# Patient Record
Sex: FEMALE | Race: WHITE | NOT HISPANIC OR LATINO | Employment: FULL TIME | URBAN - METROPOLITAN AREA
[De-identification: names, ages, dates, MRNs, and addresses within clinical notes are randomized per-mention and may not be internally consistent; named-entity substitution may affect disease eponyms.]

---

## 2023-03-05 PROBLEM — J06.9 URI (UPPER RESPIRATORY INFECTION): Status: ACTIVE | Noted: 2023-03-05

## 2023-03-05 PROBLEM — L98.499 SKIN ULCERATION (MULTI): Status: ACTIVE | Noted: 2023-03-05

## 2023-03-05 PROBLEM — L08.9 SKIN PUSTULE: Status: ACTIVE | Noted: 2023-03-05

## 2023-03-05 RX ORDER — NORGESTIMATE AND ETHINYL ESTRADIOL 0.25-0.035
1 KIT ORAL DAILY
COMMUNITY
Start: 2021-07-30 | End: 2023-05-11 | Stop reason: SDUPTHER

## 2023-04-28 ENCOUNTER — OFFICE VISIT (OUTPATIENT)
Dept: PRIMARY CARE | Facility: CLINIC | Age: 28
End: 2023-04-28
Payer: COMMERCIAL

## 2023-04-28 VITALS
TEMPERATURE: 98.7 F | RESPIRATION RATE: 18 BRPM | OXYGEN SATURATION: 98 % | BODY MASS INDEX: 37.94 KG/M2 | HEART RATE: 97 BPM | SYSTOLIC BLOOD PRESSURE: 126 MMHG | DIASTOLIC BLOOD PRESSURE: 82 MMHG | WEIGHT: 228 LBS

## 2023-04-28 DIAGNOSIS — J01.00 ACUTE NON-RECURRENT MAXILLARY SINUSITIS: Primary | ICD-10-CM

## 2023-04-28 DIAGNOSIS — R09.1 PLEURISY: ICD-10-CM

## 2023-04-28 PROCEDURE — 1036F TOBACCO NON-USER: CPT | Performed by: NURSE PRACTITIONER

## 2023-04-28 PROCEDURE — 99213 OFFICE O/P EST LOW 20 MIN: CPT | Performed by: NURSE PRACTITIONER

## 2023-04-28 RX ORDER — PREDNISONE 50 MG/1
50 TABLET ORAL DAILY
Qty: 5 TABLET | Refills: 0 | Status: SHIPPED | OUTPATIENT
Start: 2023-04-28 | End: 2023-05-03

## 2023-04-28 ASSESSMENT — ENCOUNTER SYMPTOMS
PALPITATIONS: 0
SINUS PRESSURE: 1
COUGH: 1
FEVER: 0
SHORTNESS OF BREATH: 0
WHEEZING: 0

## 2023-04-28 NOTE — PROGRESS NOTES
Subjective   Patient ID: Cheryl Bolivar is a 28 y.o. female who presents for Sinusitis. Symptoms initially started 4-5 days ago and had been improving, but last night felt like it had moved into her lungs and was more difficult to take a deep breath.     Sinusitis  Associated symptoms include coughing and sinus pressure. Pertinent negatives include no shortness of breath.       Review of Systems   Constitutional:  Negative for fever.   HENT:  Positive for sinus pressure.    Respiratory:  Positive for cough. Negative for shortness of breath and wheezing.    Cardiovascular:  Negative for chest pain and palpitations.       Objective   /82   Pulse 97   Temp 37.1 °C (98.7 °F)   Resp 18   Wt 103 kg (228 lb)   SpO2 98%   BMI 37.94 kg/m²     Physical Exam  Constitutional:       Appearance: Normal appearance.   HENT:      Right Ear: Tympanic membrane normal.      Left Ear: Tympanic membrane normal.      Nose: Rhinorrhea present.      Mouth/Throat:      Mouth: Mucous membranes are moist.   Cardiovascular:      Rate and Rhythm: Normal rate and regular rhythm.   Pulmonary:      Effort: Pulmonary effort is normal.      Comments: Left upper lobe pleural friction rub  Otherwise clear.  Moist, non-productive cough    Musculoskeletal:      Cervical back: Normal range of motion.   Neurological:      Mental Status: She is alert.     Assessment/Plan   Problem List Items Addressed This Visit    None  Visit Diagnoses       Acute non-recurrent maxillary sinusitis    -  Primary    Continue supportive care w/ OTC meds, Robitussin DM, nasocort. Encourage increased oral fluids.    Pleurisy        Relevant Medications    predniSONE (Deltasone) 50 mg tablet          Follow-up as needed if symptoms do not resolve w/ treatment or if you develop dyspnea.

## 2023-05-11 ENCOUNTER — OFFICE VISIT (OUTPATIENT)
Dept: PRIMARY CARE | Facility: CLINIC | Age: 28
End: 2023-05-11
Payer: COMMERCIAL

## 2023-05-11 VITALS
OXYGEN SATURATION: 100 % | HEART RATE: 60 BPM | DIASTOLIC BLOOD PRESSURE: 72 MMHG | TEMPERATURE: 98.5 F | HEIGHT: 65 IN | BODY MASS INDEX: 37.15 KG/M2 | RESPIRATION RATE: 16 BRPM | WEIGHT: 223 LBS | SYSTOLIC BLOOD PRESSURE: 118 MMHG

## 2023-05-11 DIAGNOSIS — R05.2 SUBACUTE COUGH: Primary | ICD-10-CM

## 2023-05-11 DIAGNOSIS — Z30.41 ENCOUNTER FOR SURVEILLANCE OF CONTRACEPTIVE PILLS: ICD-10-CM

## 2023-05-11 DIAGNOSIS — Z00.00 HEALTH CARE MAINTENANCE: ICD-10-CM

## 2023-05-11 PROBLEM — L08.9 SKIN PUSTULE: Status: RESOLVED | Noted: 2023-03-05 | Resolved: 2023-05-11

## 2023-05-11 PROBLEM — L98.499 SKIN ULCERATION (MULTI): Status: RESOLVED | Noted: 2023-03-05 | Resolved: 2023-05-11

## 2023-05-11 PROBLEM — J06.9 URI (UPPER RESPIRATORY INFECTION): Status: RESOLVED | Noted: 2023-03-05 | Resolved: 2023-05-11

## 2023-05-11 PROCEDURE — 1036F TOBACCO NON-USER: CPT | Performed by: INTERNAL MEDICINE

## 2023-05-11 PROCEDURE — 99395 PREV VISIT EST AGE 18-39: CPT | Performed by: INTERNAL MEDICINE

## 2023-05-11 RX ORDER — ALBUTEROL SULFATE 90 UG/1
2 AEROSOL, METERED RESPIRATORY (INHALATION) EVERY 4 HOURS PRN
Qty: 8.5 G | Refills: 0 | Status: SHIPPED | OUTPATIENT
Start: 2023-05-11 | End: 2024-05-10

## 2023-05-11 RX ORDER — NORGESTIMATE AND ETHINYL ESTRADIOL 0.25-0.035
1 KIT ORAL DAILY
Qty: 84 TABLET | Refills: 3 | Status: SHIPPED | OUTPATIENT
Start: 2023-05-11

## 2023-05-11 RX ORDER — ASCORBIC ACID/MULTIVIT-MIN 1000 MG
EFFERVESCENT POWDER IN PACKET ORAL DAILY
COMMUNITY

## 2023-05-11 ASSESSMENT — ENCOUNTER SYMPTOMS
ARTHRALGIAS: 0
EYE REDNESS: 0
HEADACHES: 0
PALPITATIONS: 0
SHORTNESS OF BREATH: 0
NAUSEA: 0
WHEEZING: 0
EYE PAIN: 0
SORE THROAT: 0
EYE ITCHING: 0
WEAKNESS: 0
DIFFICULTY URINATING: 0
UNEXPECTED WEIGHT CHANGE: 0
FATIGUE: 0
DIARRHEA: 0
RHINORRHEA: 0
FREQUENCY: 0
FEVER: 0
DYSURIA: 0
COUGH: 0
PSYCHIATRIC NEGATIVE: 1
CONSTIPATION: 0
ABDOMINAL PAIN: 0
BACK PAIN: 0

## 2023-05-11 ASSESSMENT — PATIENT HEALTH QUESTIONNAIRE - PHQ9
2. FEELING DOWN, DEPRESSED OR HOPELESS: NOT AT ALL
1. LITTLE INTEREST OR PLEASURE IN DOING THINGS: NOT AT ALL
SUM OF ALL RESPONSES TO PHQ9 QUESTIONS 1 AND 2: 0

## 2023-05-11 ASSESSMENT — PAIN SCALES - GENERAL: PAINLEVEL: 0-NO PAIN

## 2023-05-11 NOTE — PROGRESS NOTES
"Subjective   Cheryl Bolivar is a 28 y.o. female who presents for [facility].    HPI   New pt to establish    Reports recent h/o Pleurisy.  Seen at [facility] x2 weeks ago.  Rx: Prednisone  Steroid improved SOB.  Reports continued non-productive cough.  Not worse.   No current OTC tx.  Denies chest pain/tightness.     Review of Systems   Constitutional:  Negative for fatigue, fever and unexpected weight change.   HENT:  Negative for congestion, ear pain, rhinorrhea and sore throat.    Eyes:  Negative for pain, redness and itching.   Respiratory:  Negative for cough, shortness of breath and wheezing.    Cardiovascular:  Negative for chest pain, palpitations and leg swelling.   Gastrointestinal:  Negative for abdominal pain, constipation, diarrhea and nausea.   Genitourinary:  Negative for difficulty urinating, dysuria and frequency.   Musculoskeletal:  Negative for arthralgias and back pain.   Allergic/Immunologic: Negative for environmental allergies, food allergies and immunocompromised state.   Neurological:  Negative for weakness and headaches.   Psychiatric/Behavioral: Negative.     All other systems reviewed and are negative.      Health Maintenance Due   Topic Date Due    Yearly Adult Physical  Never done    Lipid Panel  Never done    COVID-19 Vaccine (1) Never done    Cervical Cancer Screening  Never done       Objective   /72   Pulse 60   Temp 36.9 °C (98.5 °F)   Resp 16   Ht 1.651 m (5' 5\")   Wt 101 kg (223 lb)   LMP 04/27/2023   SpO2 100%   BMI 37.11 kg/m²     Physical Exam  Vitals and nursing note reviewed.   Constitutional:       Appearance: Normal appearance.   HENT:      Head: Normocephalic.   Eyes:      Conjunctiva/sclera: Conjunctivae normal.      Pupils: Pupils are equal, round, and reactive to light.   Cardiovascular:      Rate and Rhythm: Normal rate and regular rhythm.      Pulses: Normal pulses.      Heart sounds: Normal heart sounds.   Pulmonary:      Effort: Pulmonary " effort is normal.      Breath sounds: Normal breath sounds.   Musculoskeletal:         General: No swelling.      Cervical back: Neck supple.   Skin:     General: Skin is warm and dry.   Neurological:      General: No focal deficit present.      Mental Status: She is oriented to person, place, and time.         Assessment/Plan   Problem List Items Addressed This Visit    None  Visit Diagnoses       Subacute cough    -  Primary    Relevant Medications    albuterol (ProAir HFA) 90 mcg/actuation inhaler    Encounter for surveillance of contraceptive pills        Relevant Medications    norgestimate-ethinyl estradioL (Ortho-Cyclen) 0.25-35 mg-mcg tablet    Health care maintenance        Relevant Orders    CBC    Comprehensive Metabolic Panel    Lipid Panel    Thyroid Stimulating Hormone    Vitamin B12    Vitamin D, Total            Fu with gyn  Call if cough not resolving

## 2023-10-12 ENCOUNTER — APPOINTMENT (OUTPATIENT)
Dept: PRIMARY CARE | Facility: CLINIC | Age: 28
End: 2023-10-12
Payer: COMMERCIAL

## 2023-11-08 ENCOUNTER — TELEMEDICINE (OUTPATIENT)
Dept: PRIMARY CARE | Facility: CLINIC | Age: 28
End: 2023-11-08
Payer: COMMERCIAL

## 2023-11-08 DIAGNOSIS — R21 ATYPICAL EXANTHEM: Primary | ICD-10-CM

## 2023-11-08 PROCEDURE — 99212 OFFICE O/P EST SF 10 MIN: CPT | Performed by: INTERNAL MEDICINE

## 2023-11-08 RX ORDER — HYDROCORTISONE 25 MG/G
CREAM TOPICAL 2 TIMES DAILY PRN
Qty: 20 G | Refills: 0 | Status: SHIPPED | OUTPATIENT
Start: 2023-11-08 | End: 2024-02-08 | Stop reason: ALTCHOICE

## 2023-11-08 RX ORDER — HYDROXYZINE HYDROCHLORIDE 10 MG/1
10 TABLET, FILM COATED ORAL DAILY PRN
Qty: 7 TABLET | Refills: 0 | Status: SHIPPED | OUTPATIENT
Start: 2023-11-08 | End: 2024-02-08 | Stop reason: ALTCHOICE

## 2023-11-08 NOTE — LETTER
November 13, 2023     Patient: Cheryl Bolivar   YOB: 1995   Date of Visit: 11/8/2023       To Whom It May Concern:    Cheryl Bolivar was seen in my clinic on 11/8/2023 at 3:35 pm. Please excuse Cheryl from work starting 11/8/23 till 11/9/23 due to her recent rash.    If you have any questions or concerns, please don't hesitate to call.         Sincerely,         Prashant Alcantara MD        CC: No Recipients

## 2023-11-08 NOTE — PROGRESS NOTES
Subjective   Patient ID: Cheryl Bolivar is a 28 y.o. female who presents for No chief complaint on file..    HPI patient is attended by video call because she has noticed a rash on her face and hands since this morning.  Her symptoms are associated with some pruritus.  She had an episode of chills last night which seems to have resolved.  She denies any fever, chills, arthralgia, tick bite or any recent travel.  Her son recently got diagnosed with hand-foot-and-mouth disease.  She denies any significant past medical history.    Review of Systems   Constitutional: Negative.    HENT: Negative.     Eyes: Negative.    Respiratory: Negative.     Cardiovascular: Negative.    Gastrointestinal: Negative.    Endocrine: Negative.    Genitourinary: Negative.    Musculoskeletal: Negative.    Skin:  Positive for rash.   Allergic/Immunologic: Negative.    Neurological: Negative.    Hematological: Negative.    Psychiatric/Behavioral: Negative.         Objective   There were no vitals taken for this visit.    Physical Examper pictures uploaded byi phone    Assessment/Plan    patient is given trial with hydrocortisone and Atarax regarding her recent rash.  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  She is advised to do good hand hygiene.  She will follow-up with her PCP if she has no improvement in her symptoms.

## 2023-11-09 ASSESSMENT — ENCOUNTER SYMPTOMS
COUGH: 0
RHINORRHEA: 0
FEVER: 0
ANOREXIA: 0
VOMITING: 0
NAIL CHANGES: 0
EYE PAIN: 0
SHORTNESS OF BREATH: 0
DIARRHEA: 0
FATIGUE: 0
SORE THROAT: 1

## 2023-11-10 ENCOUNTER — OFFICE VISIT (OUTPATIENT)
Dept: PRIMARY CARE | Facility: CLINIC | Age: 28
End: 2023-11-10
Payer: COMMERCIAL

## 2023-11-10 ENCOUNTER — APPOINTMENT (OUTPATIENT)
Dept: PRIMARY CARE | Facility: CLINIC | Age: 28
End: 2023-11-10
Payer: COMMERCIAL

## 2023-11-10 VITALS
RESPIRATION RATE: 18 BRPM | OXYGEN SATURATION: 98 % | HEART RATE: 99 BPM | DIASTOLIC BLOOD PRESSURE: 78 MMHG | BODY MASS INDEX: 37.77 KG/M2 | WEIGHT: 227 LBS | SYSTOLIC BLOOD PRESSURE: 122 MMHG | TEMPERATURE: 98.7 F

## 2023-11-10 DIAGNOSIS — B08.4 HAND, FOOT AND MOUTH DISEASE: Primary | ICD-10-CM

## 2023-11-10 PROCEDURE — 99213 OFFICE O/P EST LOW 20 MIN: CPT | Performed by: NURSE PRACTITIONER

## 2023-11-10 PROCEDURE — 1036F TOBACCO NON-USER: CPT | Performed by: NURSE PRACTITIONER

## 2023-11-10 NOTE — LETTER
November 10, 2023     Patient: Cheryl Bolivar   YOB: 1995   Date of Visit: 11/10/2023       To Whom It May Concern:    Cheryl Bolivar was seen in my clinic on 11/10/2023 at 3:10 pm. Please excuse Cheryl for her absence from work on this day to make the appointment. She will need to remain off work until lesions scab over, likely at least a week.    If you have any questions or concerns, please don't hesitate to call.         Sincerely,         [facility]        CC: No Recipients

## 2023-11-10 NOTE — PROGRESS NOTES
Subjective   Patient ID: Cheryl Bolivar is a 28 y.o. female who presents for Rash.     Rash  Episode onset: 2 days. The rash is diffuse. The rash is characterized by pain.    Review of Systems   Skin:  Positive for rash.   Objective   /78   Pulse 99   Temp 37.1 °C (98.7 °F)   Resp 18   Wt 103 kg (227 lb)   SpO2 98%   BMI 37.77 kg/m²   Physical Exam  Constitutional:       Appearance: Normal appearance.   Skin:     General: Skin is warm and dry.      Findings: Rash present.      Comments: Exanthum on forehead, nose, chin, lips, merle hands/palms, merle feet. Some spread to upper arms. No intra-oral lesions.    Neurological:      Mental Status: She is alert.     Assessment/Plan   1. Hand, foot and mouth disease        You will need to isolate until lesions scab over.  Follow up with your PCP as needed.

## 2023-11-11 ASSESSMENT — ENCOUNTER SYMPTOMS
CARDIOVASCULAR NEGATIVE: 1
RESPIRATORY NEGATIVE: 1
NEUROLOGICAL NEGATIVE: 1
GASTROINTESTINAL NEGATIVE: 1
CONSTITUTIONAL NEGATIVE: 1
EYES NEGATIVE: 1
HEMATOLOGIC/LYMPHATIC NEGATIVE: 1
MUSCULOSKELETAL NEGATIVE: 1
ALLERGIC/IMMUNOLOGIC NEGATIVE: 1
ENDOCRINE NEGATIVE: 1
PSYCHIATRIC NEGATIVE: 1

## 2023-11-13 ENCOUNTER — OFFICE VISIT (OUTPATIENT)
Dept: PRIMARY CARE | Facility: CLINIC | Age: 28
End: 2023-11-13
Payer: COMMERCIAL

## 2023-11-13 VITALS
HEART RATE: 68 BPM | SYSTOLIC BLOOD PRESSURE: 118 MMHG | OXYGEN SATURATION: 100 % | HEIGHT: 65 IN | RESPIRATION RATE: 16 BRPM | TEMPERATURE: 97.9 F | BODY MASS INDEX: 38.32 KG/M2 | DIASTOLIC BLOOD PRESSURE: 78 MMHG | WEIGHT: 230 LBS

## 2023-11-13 DIAGNOSIS — B08.4 HAND, FOOT AND MOUTH DISEASE (HFMD): Primary | ICD-10-CM

## 2023-11-13 PROCEDURE — 1036F TOBACCO NON-USER: CPT | Performed by: INTERNAL MEDICINE

## 2023-11-13 PROCEDURE — 99213 OFFICE O/P EST LOW 20 MIN: CPT | Performed by: INTERNAL MEDICINE

## 2023-11-13 ASSESSMENT — ENCOUNTER SYMPTOMS
FEVER: 0
SORE THROAT: 0
EYE PAIN: 0
VOMITING: 0
FATIGUE: 0
RHINORRHEA: 0
SHORTNESS OF BREATH: 0
NAIL CHANGES: 0
COUGH: 0
DIARRHEA: 0
ANOREXIA: 0

## 2023-11-13 NOTE — PROGRESS NOTES
"Subjective   Cheryl Bolivar is a 28 y.o. female who presents for Hand, Foot, and Mouth disease.    Rash  This is a new problem. The current episode started in the past 7 days. The problem is unchanged. The rash is characterized by dryness, itchiness and peeling. She was exposed to nothing. Pertinent negatives include no anorexia, congestion, cough, diarrhea, eye pain, facial edema, fatigue, fever, joint pain, nail changes, rhinorrhea, shortness of breath, sore throat or vomiting.      Reports rash x1 week.  Son diagnosed w/hand, foot, mouth previously.  Had virtual visit on 11/8/23.  Rx: Hydrocortisone, Hydroxyzine.  Seen at [facility] 11/10/23.   Lesions scabbed over.  Lesions near nares tender.      Review of Systems   Constitutional:  Negative for fatigue and fever.   HENT:  Negative for congestion, rhinorrhea and sore throat.    Eyes:  Negative for pain.   Respiratory:  Negative for cough and shortness of breath.    Gastrointestinal:  Negative for anorexia, diarrhea and vomiting.   Musculoskeletal:  Negative for joint pain.   Skin:  Positive for rash. Negative for nail changes.       Health Maintenance Due   Topic Date Due    Lipid Panel  Never done    COVID-19 Vaccine (1) Never done    Cervical Cancer Screening  01/06/2023       Objective   /78   Pulse 68   Temp 36.6 °C (97.9 °F)   Resp 16   Ht 1.651 m (5' 5\")   Wt 104 kg (230 lb)   SpO2 100%   BMI 38.27 kg/m²     Physical Exam  Vitals and nursing note reviewed.   Constitutional:       Appearance: Normal appearance.   HENT:      Head: Normocephalic.   Eyes:      Conjunctiva/sclera: Conjunctivae normal.      Pupils: Pupils are equal, round, and reactive to light.   Cardiovascular:      Rate and Rhythm: Normal rate and regular rhythm.      Pulses: Normal pulses.      Heart sounds: Normal heart sounds.   Pulmonary:      Effort: Pulmonary effort is normal.      Breath sounds: Normal breath sounds.   Musculoskeletal:         General: No swelling.    "   Cervical back: Neck supple.   Skin:     General: Skin is warm and dry.   Neurological:      General: No focal deficit present.      Mental Status: She is oriented to person, place, and time.     Scabbed rash on hands and face    Assessment/Plan   Problem List Items Addressed This Visit    None  Visit Diagnoses       Hand, foot and mouth disease (HFMD)    -  Primary            Missed 1st 8th of November  Return on 11/14/23  Ok to rtw

## 2023-12-12 ENCOUNTER — TELEPHONE (OUTPATIENT)
Dept: PRIMARY CARE | Facility: CLINIC | Age: 28
End: 2023-12-12
Payer: COMMERCIAL

## 2023-12-12 NOTE — TELEPHONE ENCOUNTER
She is calling asking about paper work that was faxed over the beginning of November for disability. Can you please advise?

## 2023-12-29 DIAGNOSIS — B08.4 HAND, FOOT AND MOUTH DISEASE (HFMD): Primary | ICD-10-CM

## 2024-02-08 ENCOUNTER — OFFICE VISIT (OUTPATIENT)
Dept: PRIMARY CARE | Facility: CLINIC | Age: 29
End: 2024-02-08
Payer: COMMERCIAL

## 2024-02-08 VITALS
TEMPERATURE: 97.9 F | WEIGHT: 232 LBS | HEART RATE: 68 BPM | DIASTOLIC BLOOD PRESSURE: 70 MMHG | HEIGHT: 65 IN | BODY MASS INDEX: 38.65 KG/M2 | OXYGEN SATURATION: 98 % | SYSTOLIC BLOOD PRESSURE: 110 MMHG | RESPIRATION RATE: 16 BRPM

## 2024-02-08 DIAGNOSIS — H66.003 NON-RECURRENT ACUTE SUPPURATIVE OTITIS MEDIA OF BOTH EARS WITHOUT SPONTANEOUS RUPTURE OF TYMPANIC MEMBRANES: Primary | ICD-10-CM

## 2024-02-08 PROCEDURE — 99213 OFFICE O/P EST LOW 20 MIN: CPT | Performed by: INTERNAL MEDICINE

## 2024-02-08 PROCEDURE — 1036F TOBACCO NON-USER: CPT | Performed by: INTERNAL MEDICINE

## 2024-02-08 RX ORDER — CEFDINIR 300 MG/1
300 CAPSULE ORAL 2 TIMES DAILY
Qty: 14 CAPSULE | Refills: 0 | Status: SHIPPED | OUTPATIENT
Start: 2024-02-08 | End: 2024-02-15

## 2024-02-08 RX ORDER — METHYLPREDNISOLONE 4 MG/1
TABLET ORAL
Qty: 21 TABLET | Refills: 0 | Status: SHIPPED | OUTPATIENT
Start: 2024-02-08 | End: 2024-02-15

## 2024-02-08 ASSESSMENT — ENCOUNTER SYMPTOMS
COUGH: 0
DIARRHEA: 0
SORE THROAT: 0
NECK PAIN: 0
VOMITING: 0
RHINORRHEA: 0
HEADACHES: 0
ABDOMINAL PAIN: 0

## 2024-02-08 ASSESSMENT — PATIENT HEALTH QUESTIONNAIRE - PHQ9
2. FEELING DOWN, DEPRESSED OR HOPELESS: NOT AT ALL
SUM OF ALL RESPONSES TO PHQ9 QUESTIONS 1 AND 2: 0
1. LITTLE INTEREST OR PLEASURE IN DOING THINGS: NOT AT ALL

## 2024-02-08 NOTE — PROGRESS NOTES
"Subjective   Cheryl Bolivar is a 28 y.o. female who presents for Ear Fullness.    Earache   There is pain in the left ear. This is a recurrent problem. The current episode started 1 to 4 weeks ago. The problem occurs every few hours. The problem has been waxing and waning. There has been no fever. The pain is at a severity of 1/10. Associated symptoms include hearing loss. Pertinent negatives include no abdominal pain, coughing, diarrhea, ear discharge, headaches, neck pain, rash, rhinorrhea, sore throat or vomiting.      Pt reports [facility] visit x3 weeks ago.  Pain, fullness L ear.  Was told fluid in L ear.  Some redness.  Tx: ATB BID x10 days.  Tried Flonase.  Pt reports improvement after ATB, then worsening sx's.    Voices improvement today.    Review of Systems   HENT:  Positive for ear pain and hearing loss. Negative for ear discharge, rhinorrhea and sore throat.    Respiratory:  Negative for cough.    Gastrointestinal:  Negative for abdominal pain, diarrhea and vomiting.   Musculoskeletal:  Negative for neck pain.   Skin:  Negative for rash.   Neurological:  Negative for headaches.       Health Maintenance Due   Topic Date Due    Lipid Panel  Never done    COVID-19 Vaccine (1) Never done    Cervical Cancer Screening  01/06/2023       Objective   /70   Pulse 68   Temp 36.6 °C (97.9 °F)   Resp 16   Ht 1.651 m (5' 5\")   Wt 105 kg (232 lb)   SpO2 98%   BMI 38.61 kg/m²     Physical Exam  Vitals and nursing note reviewed.   Constitutional:       Appearance: Normal appearance.   HENT:      Head: Normocephalic.   Eyes:      Conjunctiva/sclera: Conjunctivae normal.      Pupils: Pupils are equal, round, and reactive to light.   Cardiovascular:      Rate and Rhythm: Normal rate and regular rhythm.      Pulses: Normal pulses.      Heart sounds: Normal heart sounds.   Pulmonary:      Effort: Pulmonary effort is normal.      Breath sounds: Normal breath sounds.   Musculoskeletal:         General: No " swelling.      Cervical back: Neck supple.   Skin:     General: Skin is warm and dry.   Neurological:      General: No focal deficit present.      Mental Status: She is oriented to person, place, and time.         Assessment/Plan   Problem List Items Addressed This Visit    None  Visit Diagnoses       Non-recurrent acute suppurative otitis media of both ears without spontaneous rupture of tympanic membranes    -  Primary    Relevant Medications    cefdinir (Omnicef) 300 mg capsule    methylPREDNISolone (Medrol Dospak) 4 mg tablets          Second round of abx   Call if not better

## 2024-02-14 ENCOUNTER — TELEPHONE (OUTPATIENT)
Dept: PRIMARY CARE | Facility: CLINIC | Age: 29
End: 2024-02-14
Payer: COMMERCIAL

## 2024-02-14 NOTE — TELEPHONE ENCOUNTER
Pt called because she believes she is having a reaction to Medrol Dospak. Yesterday she had a rash appear on her face, arms, and torso. Today it's just her arms, she said they are red, they burn and they are itchy.

## 2024-06-17 ENCOUNTER — APPOINTMENT (OUTPATIENT)
Dept: PRIMARY CARE | Facility: CLINIC | Age: 29
End: 2024-06-17
Payer: COMMERCIAL

## 2024-08-07 ENCOUNTER — APPOINTMENT (OUTPATIENT)
Dept: PRIMARY CARE | Facility: CLINIC | Age: 29
End: 2024-08-07
Payer: COMMERCIAL

## 2024-08-13 ENCOUNTER — APPOINTMENT (OUTPATIENT)
Dept: PRIMARY CARE | Facility: CLINIC | Age: 29
End: 2024-08-13
Payer: COMMERCIAL

## 2024-09-08 ASSESSMENT — PROMIS GLOBAL HEALTH SCALE
RATE_AVERAGE_PAIN: 0
RATE_QUALITY_OF_LIFE: VERY GOOD
CARRYOUT_SOCIAL_ACTIVITIES: VERY GOOD
RATE_PHYSICAL_HEALTH: GOOD
RATE_GENERAL_HEALTH: VERY GOOD
RATE_SOCIAL_SATISFACTION: GOOD
EMOTIONAL_PROBLEMS: RARELY
RATE_AVERAGE_FATIGUE: MILD
RATE_MENTAL_HEALTH: VERY GOOD
CARRYOUT_PHYSICAL_ACTIVITIES: MOSTLY

## 2024-09-12 ENCOUNTER — APPOINTMENT (OUTPATIENT)
Dept: PRIMARY CARE | Facility: CLINIC | Age: 29
End: 2024-09-12
Payer: COMMERCIAL

## 2024-09-12 VITALS
OXYGEN SATURATION: 100 % | DIASTOLIC BLOOD PRESSURE: 80 MMHG | WEIGHT: 233 LBS | RESPIRATION RATE: 16 BRPM | SYSTOLIC BLOOD PRESSURE: 112 MMHG | BODY MASS INDEX: 38.82 KG/M2 | HEIGHT: 65 IN | TEMPERATURE: 97.9 F | HEART RATE: 76 BPM

## 2024-09-12 DIAGNOSIS — Z00.00 HEALTH CARE MAINTENANCE: Primary | ICD-10-CM

## 2024-09-12 DIAGNOSIS — L02.91 ABSCESS: ICD-10-CM

## 2024-09-12 PROCEDURE — 99395 PREV VISIT EST AGE 18-39: CPT | Performed by: INTERNAL MEDICINE

## 2024-09-12 PROCEDURE — 1036F TOBACCO NON-USER: CPT | Performed by: INTERNAL MEDICINE

## 2024-09-12 PROCEDURE — 3008F BODY MASS INDEX DOCD: CPT | Performed by: INTERNAL MEDICINE

## 2024-09-12 RX ORDER — DOXYCYCLINE 100 MG/1
100 CAPSULE ORAL 2 TIMES DAILY
Qty: 10 CAPSULE | Refills: 0 | Status: SHIPPED | OUTPATIENT
Start: 2024-09-12 | End: 2024-09-17

## 2024-09-12 ASSESSMENT — ENCOUNTER SYMPTOMS
DIFFICULTY URINATING: 0
ABDOMINAL PAIN: 0
CONSTIPATION: 0
WHEEZING: 0
NAUSEA: 0
UNEXPECTED WEIGHT CHANGE: 0
EYE REDNESS: 0
COUGH: 0
FEVER: 0
HEADACHES: 0
EYE ITCHING: 0
SORE THROAT: 0
DIARRHEA: 0
FREQUENCY: 0
WEAKNESS: 0
PSYCHIATRIC NEGATIVE: 1
DYSURIA: 0
BACK PAIN: 0
SHORTNESS OF BREATH: 0
PALPITATIONS: 0
EYE PAIN: 0
RHINORRHEA: 0
ARTHRALGIAS: 0
FATIGUE: 0

## 2024-09-12 ASSESSMENT — PATIENT HEALTH QUESTIONNAIRE - PHQ9
SUM OF ALL RESPONSES TO PHQ9 QUESTIONS 1 AND 2: 0
1. LITTLE INTEREST OR PLEASURE IN DOING THINGS: NOT AT ALL
2. FEELING DOWN, DEPRESSED OR HOPELESS: NOT AT ALL

## 2024-09-12 NOTE — PROGRESS NOTES
"Subjective   Cheryl Bolivar is a 29 y.o. female who presents for Annual Exam.    HPI   Influenza 2023  Covid declines  Tdap 2020  Pap 2020 GYN  BMI 38  Depression screen 24    Abscess    Exercise  Working on diet    Review of Systems   Constitutional:  Negative for fatigue, fever and unexpected weight change.   HENT:  Negative for congestion, ear pain, rhinorrhea and sore throat.    Eyes:  Negative for pain, redness and itching.   Respiratory:  Negative for cough, shortness of breath and wheezing.    Cardiovascular:  Negative for chest pain, palpitations and leg swelling.   Gastrointestinal:  Negative for abdominal pain, constipation, diarrhea and nausea.   Genitourinary:  Negative for difficulty urinating, dysuria and frequency.   Musculoskeletal:  Negative for arthralgias and back pain.   Allergic/Immunologic: Negative for environmental allergies, food allergies and immunocompromised state.   Neurological:  Negative for weakness and headaches.   Psychiatric/Behavioral: Negative.     All other systems reviewed and are negative.      Health Maintenance Due   Topic Date Due    Yearly Adult Physical  Never done    Lipid Panel  Never done    Cervical Cancer Screening  01/06/2023    Influenza Vaccine (1) 09/01/2024    COVID-19 Vaccine (1 - 2023-24 season) Never done       Objective   /80   Pulse 76   Temp 36.6 °C (97.9 °F)   Resp 16   Ht 1.651 m (5' 5\")   Wt 106 kg (233 lb)   SpO2 100%   BMI 38.77 kg/m²     Physical Exam  Vitals and nursing note reviewed.   Constitutional:       Appearance: Normal appearance.   HENT:      Head: Normocephalic.   Eyes:      Conjunctiva/sclera: Conjunctivae normal.      Pupils: Pupils are equal, round, and reactive to light.   Cardiovascular:      Rate and Rhythm: Normal rate and regular rhythm.      Pulses: Normal pulses.      Heart sounds: Normal heart sounds.   Pulmonary:      Effort: Pulmonary effort is normal.      Breath sounds: Normal breath sounds.   Musculoskeletal:    "      General: No swelling.      Cervical back: Neck supple.   Skin:     General: Skin is warm and dry.   Neurological:      General: No focal deficit present.      Mental Status: She is oriented to person, place, and time.         Assessment/Plan   Problem List Items Addressed This Visit    None  Visit Diagnoses       Health care maintenance    -  Primary    Relevant Orders    CBC    Comprehensive Metabolic Panel    Lipid Panel    Thyroid Stimulating Hormone    Vitamin B12    Vitamin D 25-Hydroxy,Total (for eval of Vitamin D levels)    Abscess        Relevant Medications    doxycycline (Vibramycin) 100 mg capsule

## 2024-09-15 ENCOUNTER — APPOINTMENT (OUTPATIENT)
Dept: RADIOLOGY | Facility: HOSPITAL | Age: 29
End: 2024-09-15
Payer: COMMERCIAL

## 2024-09-15 ENCOUNTER — HOSPITAL ENCOUNTER (EMERGENCY)
Facility: HOSPITAL | Age: 29
Discharge: HOME | End: 2024-09-15
Payer: COMMERCIAL

## 2024-09-15 VITALS
TEMPERATURE: 97.9 F | RESPIRATION RATE: 18 BRPM | SYSTOLIC BLOOD PRESSURE: 131 MMHG | WEIGHT: 220 LBS | HEIGHT: 65 IN | HEART RATE: 57 BPM | OXYGEN SATURATION: 100 % | BODY MASS INDEX: 36.65 KG/M2 | DIASTOLIC BLOOD PRESSURE: 90 MMHG

## 2024-09-15 DIAGNOSIS — S93.401A SPRAIN OF RIGHT ANKLE, UNSPECIFIED LIGAMENT, INITIAL ENCOUNTER: Primary | ICD-10-CM

## 2024-09-15 PROCEDURE — 73610 X-RAY EXAM OF ANKLE: CPT | Mod: RT

## 2024-09-15 PROCEDURE — 99283 EMERGENCY DEPT VISIT LOW MDM: CPT

## 2024-09-15 PROCEDURE — 73610 X-RAY EXAM OF ANKLE: CPT | Mod: RIGHT SIDE | Performed by: RADIOLOGY

## 2024-09-15 RX ORDER — ACETAMINOPHEN 325 MG/1
650 TABLET ORAL EVERY 6 HOURS PRN
Qty: 30 TABLET | Refills: 0 | Status: SHIPPED | OUTPATIENT
Start: 2024-09-15 | End: 2024-09-25

## 2024-09-15 RX ORDER — NAPROXEN 500 MG/1
500 TABLET ORAL
Qty: 30 TABLET | Refills: 0 | Status: SHIPPED | OUTPATIENT
Start: 2024-09-15 | End: 2024-09-30

## 2024-09-15 ASSESSMENT — PAIN DESCRIPTION - LOCATION: LOCATION: FOOT

## 2024-09-15 ASSESSMENT — COLUMBIA-SUICIDE SEVERITY RATING SCALE - C-SSRS
2. HAVE YOU ACTUALLY HAD ANY THOUGHTS OF KILLING YOURSELF?: NO
6. HAVE YOU EVER DONE ANYTHING, STARTED TO DO ANYTHING, OR PREPARED TO DO ANYTHING TO END YOUR LIFE?: NO
1. IN THE PAST MONTH, HAVE YOU WISHED YOU WERE DEAD OR WISHED YOU COULD GO TO SLEEP AND NOT WAKE UP?: NO

## 2024-09-15 ASSESSMENT — LIFESTYLE VARIABLES
HAVE YOU EVER FELT YOU SHOULD CUT DOWN ON YOUR DRINKING: NO
EVER FELT BAD OR GUILTY ABOUT YOUR DRINKING: NO
HAVE PEOPLE ANNOYED YOU BY CRITICIZING YOUR DRINKING: NO
EVER HAD A DRINK FIRST THING IN THE MORNING TO STEADY YOUR NERVES TO GET RID OF A HANGOVER: NO
TOTAL SCORE: 0

## 2024-09-15 ASSESSMENT — PAIN DESCRIPTION - ORIENTATION: ORIENTATION: RIGHT

## 2024-09-15 ASSESSMENT — PAIN DESCRIPTION - PAIN TYPE: TYPE: ACUTE PAIN

## 2024-09-15 ASSESSMENT — PAIN - FUNCTIONAL ASSESSMENT: PAIN_FUNCTIONAL_ASSESSMENT: 0-10

## 2024-09-15 ASSESSMENT — PAIN SCALES - GENERAL: PAINLEVEL_OUTOF10: 4

## 2024-09-15 ASSESSMENT — PAIN DESCRIPTION - DESCRIPTORS: DESCRIPTORS: ACHING;SHARP

## 2024-09-15 ASSESSMENT — PAIN DESCRIPTION - PROGRESSION: CLINICAL_PROGRESSION: NOT CHANGED

## 2024-09-15 NOTE — ED PROVIDER NOTES
HPI   Chief Complaint   Patient presents with    Foot Injury     Right foot pain that shoots up leg       Patient is a 29-year-old female presents emergency room chief complaint of acute injury to her right ankle.  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  The pain is constant worse with weightbearing.  She denies any numbness or tingling distally.  She did not fall.  No medications taken for symptoms prior to arrival.  She rates her pain a 6 out of a 10.  She denies any knee pain or hip pain.      History provided by:  Patient          Patient History   Past Medical History:   Diagnosis Date    Pure hyperglyceridemia     Hypertriglyceridemia     Past Surgical History:   Procedure Laterality Date    ADENOIDECTOMY      OTHER SURGICAL HISTORY  01/03/2020    Tonsillectomy with adenoidectomy    TONSILLECTOMY      WISDOM TOOTH EXTRACTION       Family History   Problem Relation Name Age of Onset    Hypertension Other Maternal Relatives     Diabetes Other Maternal Relatives     Other (cardiac disorder) Other Maternal Relatives     Breast cancer Other Maternal Relatives     Osteoporosis Other Maternal Relatives     Hypertension Other Paternal Relatives     Other (cardiac disorder) Other Paternal Relatives     Ovarian cancer Other Paternal Relatives     Asthma Mother Chris Bolivar     Hypertension Mother Chris Bolivar     COPD Maternal Grandmother Xiomara Granda     Diabetes Maternal Grandmother Xiomara Granda     Kidney disease Maternal Grandmother Xiomara Granda     Stroke Maternal Grandmother Xiomara Granda     Atrial fibrillation Maternal Grandmother Xiomara Granda     Breast cancer Maternal Grandmother Xiomara Granda      Social History     Tobacco Use    Smoking status: Never    Smokeless tobacco: Never   Vaping Use    Vaping status: Never Used   Substance Use Topics    Alcohol use: Yes     Alcohol/week: 2.0 standard drinks of alcohol     Types:  2 Standard drinks or equivalent per week    Drug use: Never       Physical Exam   ED Triage Vitals [09/15/24 0113]   Temperature Heart Rate Respirations BP   36.6 °C (97.9 °F) 57 18 131/90      Pulse Ox Temp Source Heart Rate Source Patient Position   100 % Temporal Monitor Sitting      BP Location FiO2 (%)     Right arm --       Physical Exam  Vitals and nursing note reviewed. Exam conducted with a chaperone present.   Constitutional:       General: She is awake. She is not in acute distress.     Appearance: Normal appearance. She is well-developed and well-groomed. She is not ill-appearing, toxic-appearing or diaphoretic.   Musculoskeletal:         General: Tenderness and signs of injury present. No swelling.      Right ankle: Tenderness present over the medial malleolus. No ATF ligament, AITF ligament, CF ligament, posterior TF ligament, base of 5th metatarsal or proximal fibula tenderness.      Right Achilles Tendon: Tenderness present. Lee's test negative.        Legs:       Comments: Is to the medial malleolus, Achilles tendon is intact there is some tenderness at the base of the Achilles tendon,   Skin:     General: Skin is warm and dry.      Findings: No erythema.   Neurological:      General: No focal deficit present.      Mental Status: She is alert and oriented to person, place, and time. Mental status is at baseline.   Psychiatric:         Mood and Affect: Mood normal.         Behavior: Behavior normal. Behavior is cooperative.         Thought Content: Thought content normal.         Judgment: Judgment normal.           ED Course & MDM   Diagnoses as of 09/15/24 0331   Sprain of right ankle, unspecified ligament, initial encounter                 No data recorded     Collins Coma Scale Score: 15 (09/15/24 0113 : Nilsa Cardenas RN)                           Medical Decision Making  Temperature 36.6 heart rate 57, respirations 18, blood pressure 131/90, pulse ox is 100% on room air  The patient declined  anything for pain, I have ordered x-rays of the right ankle.  X-ray shows no acute fracture or dislocation.  Mortise is intact no evidence of any soft tissue swelling.  I discussed results of workup with the patient.  She is placed in a Santana wrap by me and issued crutches.  She was discharged home with prescription for naproxen and Tylenol and [facility].  She was advised to rest, ice, compression elevation she was encouraged to return back to the ER sooner with any concerns or worsening of symptoms all questions answered prior to discharge        Procedure  Splint Application    Performed by: Mitchell Tavera PA-C  Authorized by: Mitchell Tavera PA-C    Consent:     Consent obtained:  Verbal    Consent given by:  Patient    Risks, benefits, and alternatives were discussed: yes      Risks discussed:  Discoloration, numbness, pain and swelling    Alternatives discussed:  No treatment  Universal protocol:     Procedure explained and questions answered to patient or proxy's satisfaction: yes      Imaging studies available: yes      Patient identity confirmed:  Verbally with patient  Pre-procedure details:     Distal neurologic exam:  Normal    Distal perfusion: distal pulses strong and brisk capillary refill    Procedure details:     Location:  Ankle    Supplies:  Cotton padding and elastic bandage    Attestation: Splint applied and adjusted personally by me    Post-procedure details:     Distal neurologic exam:  Normal    Distal perfusion: distal pulses strong and brisk capillary refill      Procedure completion:  Tolerated well, no immediate complications    Post-procedure imaging: not applicable         Mitchell Tavera PA-C  09/15/24 0334

## 2024-09-15 NOTE — Clinical Note
Cheryl Bolivar was seen and treated in our emergency department on 9/15/2024.  She may return to work on 09/17/2024.       If you have any questions or concerns, please don't hesitate to call.      Mitchell Tavera PA-C

## 2024-09-16 ENCOUNTER — OFFICE VISIT (OUTPATIENT)
Dept: ORTHOPEDIC SURGERY | Facility: CLINIC | Age: 29
End: 2024-09-16
Payer: COMMERCIAL

## 2024-09-16 VITALS — BODY MASS INDEX: 35.36 KG/M2 | WEIGHT: 220 LBS | HEIGHT: 66 IN

## 2024-09-16 DIAGNOSIS — S86.911A MUSCLE STRAIN OF LOWER LEG, RIGHT, INITIAL ENCOUNTER: Primary | ICD-10-CM

## 2024-09-16 PROCEDURE — L4361 PNEUMA/VAC WALK BOOT PRE OTS: HCPCS | Performed by: INTERNAL MEDICINE

## 2024-09-16 PROCEDURE — 99203 OFFICE O/P NEW LOW 30 MIN: CPT | Performed by: INTERNAL MEDICINE

## 2024-09-16 PROCEDURE — 1036F TOBACCO NON-USER: CPT | Performed by: INTERNAL MEDICINE

## 2024-09-16 PROCEDURE — 99213 OFFICE O/P EST LOW 20 MIN: CPT | Performed by: INTERNAL MEDICINE

## 2024-09-16 PROCEDURE — 3008F BODY MASS INDEX DOCD: CPT | Performed by: INTERNAL MEDICINE

## 2024-09-16 NOTE — LETTER
September 16, 2024     Patient: Cheryl Bolivar   YOB: 1995   Date of Visit: 9/16/2024       To Whom It May Concern:    It is my medical opinion that Cheryl Bolivar may return to work on 09/19/24 with her boots on and limited amount of walking .    If you have any questions or concerns, please don't hesitate to call.         Sincerely,        Caryn Nguyen MD    CC: No Recipients

## 2024-09-16 NOTE — PROGRESS NOTES
Acute Injury New Patient Visit    CC:   Chief Complaint   Patient presents with    Right Achilles Tendon - Pain     Patient injured ankle on Saturday while at work, trying to move a patient.        HPI: Cheryl is a 29 y.o. female presents today for evaluation for work-related right ankle injury sustained two days ago while she was moving a patient. She notes previous right ankle injury which was fully healed. She is here for initial evaluation and x-rays.         Review of Systems   GENERAL: Negative for malaise, significant weight loss, fever  MUSCULOSKELETAL: See HPI  NEURO:  Negative for numbness / tingling     Past Medical History  Past Medical History:   Diagnosis Date    Pure hyperglyceridemia     Hypertriglyceridemia       Medication review  Medication Documentation Review Audit       Reviewed by Violette Duarte MA (Medical Assistant) on 24 at 1318      Medication Order Taking? Sig Documenting Provider Last Dose Status   acetaminophen (Tylenol) 325 mg tablet [id number]  Take 2 tablets (650 mg) by mouth every 6 hours if needed for mild pain (1 - 3) for up to 10 days. Mitchell Tavera PA-C  Active   albuterol (ProAir HFA) 90 mcg/actuation inhaler [id number] No Inhale 2 puffs every 4 hours if needed for wheezing or shortness of breath.   Patient not taking: Reported on 2024    Avis Ga, DO Not Taking  05/10/24 2668   ascorbic acid-multivit-min (Emergen-C Immune Plus) 1,000 mg powder effervescent in packet [id number] No once daily. Historical Provider, MD Taking Active   doxycycline (Vibramycin) 100 mg capsule [id number]  Take 1 capsule (100 mg) by mouth 2 times a day for 5 days. Take with at least 8 ounces (large glass) of water, do not lie down for 30 minutes after Avis Ga, DO  Active   naproxen (Naprosyn) 500 mg tablet [id number]  Take 1 tablet (500 mg) by mouth 2 times daily (morning and late afternoon) for 15 days. Mitchell Tavera PA-C  Active   norgestimate-ethinyl estradioL  (Ortho-Cyclen) 0.25-35 mg-mcg tablet [id number] No Take 1 tablet by mouth once daily.   Patient not taking: Reported on 9/12/2024    Avis Ga, DO Not Taking Active                    Allergies  No Known Allergies    Social History  Social History     Socioeconomic History    Marital status: Single     Spouse name: Not on file    Number of children: Not on file    Years of education: Not on file    Highest education level: Not on file   Occupational History    Not on file   Tobacco Use    Smoking status: Never    Smokeless tobacco: Never   Vaping Use    Vaping status: Never Used   Substance and Sexual Activity    Alcohol use: Yes     Alcohol/week: 2.0 standard drinks of alcohol     Types: 2 Standard drinks or equivalent per week    Drug use: Never    Sexual activity: Yes     Partners: Male     Birth control/protection: OCP   Other Topics Concern    Not on file   Social History Narrative    Not on file     Social Determinants of Health     Financial Resource Strain: Medium Risk (8/4/2022)    Received from [facility]    Overall Financial Resource Strain (CARDIA)     Difficulty of Paying Living Expenses: Somewhat hard   Food Insecurity: Food Insecurity Present (8/4/2022)    Received from [facility]    Hunger Vital Sign     Worried About Running Out of Food in the Last Year: Sometimes true     Ran Out of Food in the Last Year: Never true   Transportation Needs: No Transportation Needs (8/4/2022)    Received from [facility]    PRAPARE - Transportation     Lack of Transportation (Medical): No     Lack of Transportation (Non-Medical): No   Physical Activity: Insufficiently Active (8/4/2022)    Received from [facility]    Exercise Vital Sign     Days of Exercise per Week: 4 days     Minutes of Exercise per Session: 30 min   Stress: No Stress Concern Present (8/4/2022)    Received from [facility]     [facility] of Occupational Health - Occupational Stress Questionnaire     Feeling of Stress : Not at all   Social Connections: Moderately Isolated (8/4/2022)    [facility]    Social Connection and Isolation Panel [NHANES]     Frequency of Communication with Friends and Family: More than three times a week     Frequency of Social Gatherings with Friends and Family: More than three times a week     Attends Alevism Services: 1 to 4 times per year     Active Member of Clubs or Organizations: No     Attends Club or Organization Meetings: Never     Marital Status: Never    Intimate Partner Violence: Not on file   Housing Stability: Low Risk  (8/4/2022)    [facility]    Housing Stability Vital Sign     Unable to Pay for Housing in the Last Year: No     Number of Places Lived in the Last Year: 2     Unstable Housing in the Last Year: No       Surgical History  Past Surgical History:   Procedure Laterality Date    ADENOIDECTOMY      OTHER SURGICAL HISTORY  01/03/2020    Tonsillectomy with adenoidectomy    TONSILLECTOMY      WISDOM TOOTH EXTRACTION         Physical Exam:  GENERAL:  Patient is awake, alert, and oriented to person place and time.  Patient appears well nourished and well kept.  Affect Calm, Not Acutely Distressed.  HEENT:  Normocephalic, Atraumatic, EOMI  CARDIOVASCULAR:  Hemodynamically stable.  RESPIRATORY:  Normal respirations with unlabored breathing.  Extremity: Right ankle shows skin is intact.  There is no erythema or warmth.  There is no clinical signs of infection.  There is no pain over the lateral malleolus.  There is no pain of the medial malleolus.  There is no pain over the ATF, CF or PTF ligament.  There is no pain over the deltoid ligament.  Mild pain over the Achilles tendon.  Pain over the plantaris tendon.  Mild pain over the myotendinous junction of the Achilles tendon.  Negative Lee's test.   Negative squeeze test.  Negative anterior drawer test.  Negative talar tilt test.  No pain over the anterior process of the talus.  There is no pain over the talar dome.  There is no pain at the base of the fifth metatarsal bone.  No pain of the calcaneus.  No pain over the plantar aponeurosis.  No pain of the midfoot.  Neurovascularly intact.      Diagnostics: X-rays reviewed  XR ankle right 3+ views  Narrative: Interpreted By:  Tony Dove,   STUDY:  XR ANKLE RIGHT 3+ VIEWS; ;  9/15/2024 2:28 am      INDICATION:  Signs/Symptoms:Twisted right ankle.          COMPARISON:  9/23/2016      ACCESSION NUMBER(S):  [id number]      ORDERING CLINICIAN:  ISABEL WEINSTEIN      FINDINGS:  No acute fracture or dislocation of the right ankle. The ankle  mortise is grossly intact. No evidence of significant soft tissue  swelling.      Impression: No acute fracture or dislocation of the right ankle.          MACRO:  None      Signed by: Tony Dove 9/15/2024 2:32 AM  Dictation workstation:   [id number]      Procedure: None    Assessment: Right lower leg strain    Plan: Cheryl presents today for initial evaluation for acute work-related right ankle injury sustained two days ago.  She has a history and physical examination consistent with a right lower leg strain, involving the plantaris tendon and Achilles tendon.  We placed her into a fracture boot with heel for support and physical therapy was made for C9 for physical therapy. She will return to work on Thursday and must wear fracture boot with limited amount of walking. She will follow-up in 3 weeks for reevaluation and to update her work status.     No orders of the defined types were placed in this encounter.     At the conclusion of the visit there were no further questions by the patient/family regarding their plan of care.  Patient was instructed to call or return with any issues, questions, or concerns regarding their injury and/or treatment plan described above.      09/16/24 at 1:36 PM - Caryn Nguyen MD  Scribe Attestation  By signing my name below, I, Jose Pilar Hernandez   attest that this documentation has been prepared under the direction and in the presence of Caryn Nguyen MD.    Office: [phone]    This note was prepared using voice recognition software.  The details of this note are correct and have been reviewed, and corrected to the best of my ability.  Some grammatical errors may persist related to the Dragon software.

## 2024-10-01 ENCOUNTER — EVALUATION (OUTPATIENT)
Dept: PHYSICAL THERAPY | Facility: CLINIC | Age: 29
End: 2024-10-01
Payer: COMMERCIAL

## 2024-10-01 DIAGNOSIS — S86.911D: Primary | ICD-10-CM

## 2024-10-01 PROCEDURE — 97161 PT EVAL LOW COMPLEX 20 MIN: CPT | Mod: GP | Performed by: PHYSICAL THERAPIST

## 2024-10-01 PROCEDURE — 97110 THERAPEUTIC EXERCISES: CPT | Mod: GP | Performed by: PHYSICAL THERAPIST

## 2024-10-01 ASSESSMENT — PATIENT HEALTH QUESTIONNAIRE - PHQ9
1. LITTLE INTEREST OR PLEASURE IN DOING THINGS: NOT AT ALL
SUM OF ALL RESPONSES TO PHQ9 QUESTIONS 1 AND 2: 0
2. FEELING DOWN, DEPRESSED OR HOPELESS: NOT AT ALL

## 2024-10-01 NOTE — PROGRESS NOTES
Physical Therapy Evaluation and Treatment      Patient Name: Cheryl Bolivar  MRN: [id number]  Today's Date: 10/1/2024  Time Calculation  Start Time: 0151  Stop Time: 0233  Time Calculation (min): 42 min      PT Evaluation Time Entry  PT Evaluation (Low) Time Entry: 25  PT Therapeutic Procedures Time Entry  Therapeutic Exercise Time Entry: 15                   INSURANCE:  Visit number: 1/12  [facility] APPROVED 12 (OPEN) PT VISITS STARTING ON EVAL DATE  (10-) C9 SENT TO [facility] 10-01-24/ALL     Referring Provider: Dr. Caryn Nguyen  Hx: Migraines    ASSESSMENT:  PT Assessment Results: decreased knowledge of HEP, activity limitations, ADLs/IADLs/self care skills, flexibility, motor function/control/tone, pain, participation restrictions, range of motion/joint mobility, strength, posture, transfers, coordination, balance, fall risk, gait/locomotion, decreased knowledge of assisted device use, integumentary, decreased knowledge of precautions.  Rehab Prognosis: Good  Evaluation/Treatment Tolerance: Patient tolerated treatment well  Stable and/or uncomplicated characteristics    Cheryl Bolivar is a 29 y.o. female presenting to the clinic s/p a R lower leg strain. Pt demonstrates decreased ROM and strength of the R LE causing pain and dysfunction with walking, stairs, squatting, full WB on R ankle, and jumping/running with her kids. Pt was given and reviewed HEP including stretching and strengthening. Pt will benefit from skilled PT in order to increase ROM and strength of the R LE so that the pt can perform ADLs without pain and return to PLOF.     PLAN:  Treatments/Interventions: gait training, traction, dry needling, edema control, education/instruction, home program, self care/home management, manual therapy, neuromuscular re-education, therapeutic activities, therapeutic exercises, modalities, therapeutic elastic taping.   PT Plan: Skilled PT  PT Frequency: 2 times per week  Duration: 12 visits  Onset Date:  09/14/24  Number of Treatments Authorized: 12 visits open range  Rehab Potential: Good  Plan of Care Agreement: Patient    Goals -    By discharge pt will achieve the following goals:   Pt will report less than a 2/10 pain at the worst.  Pt will report a significant improvement with LEFS score.  Pt will have at least 4+/5 strength for the right ankle and hip.  Pt will have AROM to WFL for the right ankle.  Pt will be independent with HEP.    CURRENT PROBLEM:   1. Muscle strain of lower leg, right, subsequent encounter            Subjective    General -    Pt reports that she was doing a squat pivot transfer with a patient and her ankle stayed and her leg twisted causing a pop in her ankle region. Pt states that she feels most of the pain now in her inner ankle.    Mechanism of Injury -    Lifting    History of Current Episode - 9/14/24    Pain -    Pain Location: R ankle  Pain Best: 0/10  Pain Today: 0/10  Pain Worst: 8/10   Pain Type: Intermittent, Pulling, Achy/Dull, Tightness  Pain Exacerbating Factors: walking, stairs, squatting, full WB on R ankle, and jumping/running with her kids.   Pain Relieving Factors: Rest, Walking Boot, Naproxen  Difficulty Sleeping: No  Night Sweats, Loss of Appetite, Unexpected Weight-loss: No  Saddle/Bowel/Bladder: No    Work -   Work Status: Part Time  [employer]  Light duty but do not have anything for her to do    Home Living -    Stairs at home have railings  Pt lives with her fiance and one child.    Patient Stated Goals -    Reduce tightness  Strengthen ankle  Back to normal    PRECAUTIONS -    None    Prior Level of Function -    I with ADLs/IADLs    Objective     Ankle AROM/PROM (degrees) -  R Ankle Dorsiflexion: 0 AROM, 2 PROM with firm end feel  R Ankle Plantarflexion: 65 AROM, 68 PROM with empty end feel  R Ankle Inversion: (started in 5 of inversion) 33 AROM, 35 PROM with empty end feel  R Ankle Eversion: (started in 5 of inversion) 12 AROM, 14 PROM with empty end feel    Ankle  MMT (/5) -  R Ankle Dorsiflexion: 4  R Ankle Plantarflexion: 4+  R Ankle Inversion: 4+  R Ankle Eversion: 4  L Ankle Dorsiflexion: 4  L Ankle Plantarflexion: 4- with pain  L Ankle Inversion: 4-  L Ankle Eversion: 4-    Hip MMT (/5) -  R hip Flexion: 4+   R hip ER: 4+   R hip IR: 4   R hip Adduction: 4+  R hip Abduction: 4   L hip Flexion: 4+    L hip ER: 4+   L hip IR: 4   L hip Adduction: 4+  L hip Abduction: 4     Lumbar AROM -   Lumbar Flexion: 70% due to tight hamstrings  Lumbar Extension: 50%  R Lumbar Side Bendin%  L Lumbar Side Bendin%    Ankle Special Tests -  DF Maneuver: negative bilateral  Squeeze Test: negative bilateral  Talar Tilt Medial (Delt): positive right  Talar Tilt Lateral (CFL/ATFL): negative bilateral    Gait -   Pt is ambulating with a walking boot.   Noted: antalgic, decreased heel strike, decreased toe push off, Trendelenburg, and trunk lean.     Outcome Measures -   Other Measures  Lower Extremity Funtional Score (LEFS): 41 (/80)     Treatment -   Evaluation -   Low (89442)  Therapeutic Exercise (98005) -     Seated Gatroc/Soleus Stretch with Strap: 30 sec ea  Ankle 4-way: RTB x10 ea  Ankle Alphabet: reviewed    OP Patient Education -   Access Code: [email or web address]  URL: [email or web address]  Date: 10/01/2024  Prepared by: Indira Lyons    Exercises  - Seated Calf Stretch with Strap  - 1-2 x daily - 3 sets - 30 seconds hold  - Seated Soleus Stretch with Strap  - 1-2 x daily - 3 sets - 30 sec hold  - Seated Ankle Plantar Flexion with Resistance Loop  - 1-2 x daily - 2-3 sets - 10 reps  - Seated Ankle Dorsiflexion with Resistance  - 1-2 x daily - 2-3 sets - 10 reps  - Seated Ankle Eversion with Resistance  - 1-2 x daily - 2-3 sets - 10 reps  - Seated Ankle Inversion with Anchored Resistance  - 1-2 x daily - 2-3 sets - 10 reps  - Seated Ankle Alphabet  - 1-2 x daily - 1-2 sets

## 2024-10-03 NOTE — PROGRESS NOTES
"Physical Therapy Treatment    Patient Name: Cheryl Bolivar  MRN: [id number]  Today's Date: 10/7/2024  Time Calculation  Start Time: 0921  Stop Time: 1001  Time Calculation (min): 40 min     PT Therapeutic Procedures Time Entry  Manual Therapy Time Entry: 8  Neuromuscular Re-Education Time Entry: 10  Therapeutic Exercise Time Entry: 20                 Current Problem  1. Muscle strain of lower leg, right, subsequent encounter            Insurance:  Visit number: 2/12  [facility] APPROVED 12 (OPEN) PT VISITS STARTING ON EVAL DATE  (10-) C9 SENT TO [facility] 10-01-24/ALL      Precautions   none    Subjective   Subjective:   Pt reports that her ankle isn't feeling too bad. Her pain now is mostly on the top/outer portion of the boot. She hasn't been wearing the boot when at home or driving.   Pain   2/10    Objective   Treatments:  Seated Gatroc/Soleus Stretch with Strap: 30 sec ea  Ankle 4-way: GTB x15 ea  4-way ankle isometrics 5\"x5  Seated ankle HR/TR 20x  Seated towel scrunches/wipers  Seated toe yoga 20x  (could only do 1 way  Seated rock cw/ccw  Standing hip abd/ ext RTB 10x ea  Tandem B 20\"ea  SLB 20\"x2  Ankle Alphabet: reviewed    Manual: PROM, metatarsal mobs 8'  OP EDUCATION:   Access Code: [email or web address]  URL: [email or web address]  Date: 10/07/2024  Prepared by: Niesha Gomes    Exercises  - Hip Abduction with Resistance Loop  - 1 x daily - 7 x weekly - 2 sets - 10 reps  - Hip Extension with Resistance Loop  - 1 x daily - 7 x weekly - 3 sets - 10 reps  - Tandem Stance  - 1 x daily - 7 x weekly - 3 sets - 20 hold      Assessment:   Pt had some pain palpated in the achilles. Fairly good PROM. Introduced ankle isometrics for more ankle strengthening. Added more foot intrinsic strengthening ex. Pt unable to lift little toes with yoga. Displayed good tandem and SLB. With a little lateral foot pain felt as time progressed. More discomfort in foot during hip abd vs ext when standing on R LE    Plan:   Pt is " ambulating with a walking boot.   Noted: antalgic, decreased heel strike, decreased toe push off, Trendelenburg, and trunk lean.

## 2024-10-07 ENCOUNTER — TREATMENT (OUTPATIENT)
Dept: PHYSICAL THERAPY | Facility: CLINIC | Age: 29
End: 2024-10-07
Payer: COMMERCIAL

## 2024-10-07 DIAGNOSIS — S86.911D: Primary | ICD-10-CM

## 2024-10-07 PROCEDURE — 97140 MANUAL THERAPY 1/> REGIONS: CPT | Mod: GP,CQ

## 2024-10-07 PROCEDURE — 97110 THERAPEUTIC EXERCISES: CPT | Mod: GP,CQ

## 2024-10-07 PROCEDURE — 97112 NEUROMUSCULAR REEDUCATION: CPT | Mod: GP,CQ

## 2024-10-09 NOTE — PROGRESS NOTES
"Physical Therapy Treatment    Patient Name: Cheryl Bolivar  MRN: [id number]  Today's Date: 10/10/2024  Time Calculation  Start Time: 1049  Stop Time: 1130  Time Calculation (min): 41 min     PT Therapeutic Procedures Time Entry  Manual Therapy Time Entry: 8  Neuromuscular Re-Education Time Entry: 12  Therapeutic Exercise Time Entry: 19                 Current Problem  1. Muscle strain of lower leg, right, subsequent encounter            Insurance:  Visit number: 3/12  [facility] APPROVED 12 (OPEN) PT VISITS STARTING ON EVAL DATE  (10-) C9 SENT TO [facility] 10-01-24/ALL   Precautions   none    Subjective   Subjective:   Pt reports her ankle is feeling pretty good. She currently doesn't have any pain. She saw MD this morning, and doesn't have to wear boot any more  Pain   0/10    Objective   Treatments:  Seated Gatroc/Soleus Stretch with Strap: 30 sec ea  Ankle 4-way: GTB x20 ea  4-way ankle isometrics 5\"x5----   HR/TR 15x  Sidestepping RTB 3 steps x 5 laps        Standing hip abd/ ext RTB 10x2 ea        Wobbleboard lat/ fwd/bk 10x ea        Marching slow on airex 15x        Squats 20x        SLB blue oval 20\"x2   Tandem B 20\"ea---        Seated rock cw/ccw----  Ankle Alphabet: reviewed---   Seated towel scrunches/wipers----  Seated toe yoga 20x  (could only do 1 way    Manual: PROM, metatarsal mobs 8'  OP EDUCATION:   Okay to return to boot if she is doing a lot of activities/walking on her feet. Upon return to work, her ankle will probably bother her more initially, due to more stress on it.      Assessment:   Pt gradually progressing with her ankle stability. Introduced many weightbearing balance activities. Pt felt a little pulling in the back of the ankle with retro on wobbleboard. Able to sustain SLS with slow marching. Pt used good form with squats. No c/o pain during therapy session    Plan:   Cont to advance with LLE strengthening              "

## 2024-10-10 ENCOUNTER — TREATMENT (OUTPATIENT)
Dept: PHYSICAL THERAPY | Facility: CLINIC | Age: 29
End: 2024-10-10
Payer: COMMERCIAL

## 2024-10-10 ENCOUNTER — OFFICE VISIT (OUTPATIENT)
Dept: ORTHOPEDIC SURGERY | Facility: CLINIC | Age: 29
End: 2024-10-10
Payer: COMMERCIAL

## 2024-10-10 DIAGNOSIS — S86.911A MUSCLE STRAIN OF LOWER LEG, RIGHT, INITIAL ENCOUNTER: Primary | ICD-10-CM

## 2024-10-10 DIAGNOSIS — S86.911D: Primary | ICD-10-CM

## 2024-10-10 PROCEDURE — 99213 OFFICE O/P EST LOW 20 MIN: CPT | Performed by: INTERNAL MEDICINE

## 2024-10-10 PROCEDURE — 97110 THERAPEUTIC EXERCISES: CPT | Mod: GP,CQ

## 2024-10-10 PROCEDURE — 97140 MANUAL THERAPY 1/> REGIONS: CPT | Mod: GP,CQ

## 2024-10-10 PROCEDURE — 97112 NEUROMUSCULAR REEDUCATION: CPT | Mod: GP,CQ

## 2024-10-10 NOTE — PROGRESS NOTES
CC:   No chief complaint on file.      HPI: Cheryl is a 29 y.o. female presents today for reevaluation for work-related right lower leg strain. She states that she is doing better, about 70-80% better. She states that she has done two physical therapy sessions. She states that she would like to return to work with no restrictions.        Review of Systems   GENERAL: Negative for malaise, significant weight loss, fever  MUSCULOSKELETAL: See HPI  NEURO:  Negative for numbness / tingling     Past Medical History  Past Medical History:   Diagnosis Date    Pure hyperglyceridemia     Hypertriglyceridemia       Medication review  Medication Documentation Review Audit       Reviewed by Violette Duarte MA (Medical Assistant) on 24 at 1318      Medication Order Taking? Sig Documenting Provider Last Dose Status   acetaminophen (Tylenol) 325 mg tablet [id number]  Take 2 tablets (650 mg) by mouth every 6 hours if needed for mild pain (1 - 3) for up to 10 days. Mitchell Tavera PA-C  Active   albuterol (ProAir HFA) 90 mcg/actuation inhaler [id number] No Inhale 2 puffs every 4 hours if needed for wheezing or shortness of breath.   Patient not taking: Reported on 2024    Avis Ga,  Not Taking  05/10/24 2839   ascorbic acid-multivit-min (Emergen-C Immune Plus) 1,000 mg powder effervescent in packet [id number] No once daily. Historical Provider, MD Taking Active   doxycycline (Vibramycin) 100 mg capsule [id number]  Take 1 capsule (100 mg) by mouth 2 times a day for 5 days. Take with at least 8 ounces (large glass) of water, do not lie down for 30 minutes after Avis Ga, DO  Active   naproxen (Naprosyn) 500 mg tablet [id number]  Take 1 tablet (500 mg) by mouth 2 times daily (morning and late afternoon) for 15 days. Mitchell Tavera PA-C  Active   norgestimate-ethinyl estradioL (Ortho-Cyclen) 0.25-35 mg-mcg tablet [id number] No Take 1 tablet by mouth once daily.   Patient not taking: Reported on  9/12/2024    Avis Ga, DO Not Taking Active                    Allergies  No Known Allergies    Social History  Social History     Socioeconomic History    Marital status: Single     Spouse name: Not on file    Number of children: Not on file    Years of education: Not on file    Highest education level: Not on file   Occupational History    Not on file   Tobacco Use    Smoking status: Never    Smokeless tobacco: Never   Vaping Use    Vaping status: Never Used   Substance and Sexual Activity    Alcohol use: Yes     Alcohol/week: 2.0 standard drinks of alcohol     Types: 2 Standard drinks or equivalent per week    Drug use: Never    Sexual activity: Yes     Partners: Male     Birth control/protection: OCP   Other Topics Concern    Not on file   Social History Narrative    Not on file     Social Determinants of Health     Financial Resource Strain: Medium Risk (8/4/2022)    Received from [facility]    Overall Financial Resource Strain (CARDIA)     Difficulty of Paying Living Expenses: Somewhat hard   Food Insecurity: Food Insecurity Present (8/4/2022)    Received from [facility]    Hunger Vital Sign     Worried About Running Out of Food in the Last Year: Sometimes true     Ran Out of Food in the Last Year: Never true   Transportation Needs: No Transportation Needs (8/4/2022)    Received from [facility]    PRAPARE - Transportation     Lack of Transportation (Medical): No     Lack of Transportation (Non-Medical): No   Physical Activity: Insufficiently Active (8/4/2022)    Received from [facility]    Exercise Vital Sign     Days of Exercise per Week: 4 days     Minutes of Exercise per Session: 30 min   Stress: No Stress Concern Present (8/4/2022)    Received from [facility]    [employer] - Occupational Stress Questionnaire     Feeling of Stress : Not at all   Social  Connections: Moderately Isolated (8/4/2022)    [facility]    Social Connection and Isolation Panel [NHANES]     Frequency of Communication with Friends and Family: More than three times a week     Frequency of Social Gatherings with Friends and Family: More than three times a week     Attends [employer] Services: 1 to 4 times per year     Active Member of Clubs or Organizations: No     Attends Club or Organization Meetings: Never     Marital Status: Never    Intimate Partner Violence: Not on file   Housing Stability: Low Risk  (8/4/2022)    [facility]    Housing Stability Vital Sign     Unable to Pay for Housing in the Last Year: No     Number of Places Lived in the Last Year: 2     Unstable Housing in the Last Year: No       Surgical History  Past Surgical History:   Procedure Laterality Date    ADENOIDECTOMY      OTHER SURGICAL HISTORY  01/03/2020    Tonsillectomy with adenoidectomy    TONSILLECTOMY      WISDOM TOOTH EXTRACTION         Physical Exam:  GENERAL:  Patient is awake, alert, and oriented to person place and time.  Patient appears well nourished and well kept.  Affect Calm, Not Acutely Distressed.  HEENT:  Normocephalic, Atraumatic, EOMI  CARDIOVASCULAR:  Hemodynamically stable.  RESPIRATORY:  Normal respirations with unlabored breathing.  Extremity: Right ankle shows skin is intact.  There is no erythema or warmth.  There is no clinical signs of infection.  There is no pain over the lateral malleolus.  There is no pain of the medial malleolus.  There is no pain over the ATF, CF or PTF ligament.  There is no pain over the deltoid ligament.  No pain over the Achilles tendon.  Minimal pain over the plantaris tendon.  No pain over the myotendinous junction of the Achilles tendon.  Negative Lee's test.  Negative squeeze test.  Negative anterior drawer test.  Negative talar tilt test.  No pain over the anterior process of  the talus.  There is no pain over the talar dome.  There is no pain at the base of the fifth metatarsal bone.  No pain of the calcaneus.  No pain over the plantar aponeurosis.  No pain of the midfoot.  Neurovascularly intact.       Diagnostics: None today  XR ankle right 3+ views  Narrative: Interpreted By:  Tony Dove,   STUDY:  XR ANKLE RIGHT 3+ VIEWS; ;  9/15/2024 2:28 am      INDICATION:  Signs/Symptoms:Twisted right ankle.          COMPARISON:  9/23/2016      ACCESSION NUMBER(S):  [id number]      ORDERING CLINICIAN:  ISABEL WEINSTEIN      FINDINGS:  No acute fracture or dislocation of the right ankle. The ankle  mortise is grossly intact. No evidence of significant soft tissue  swelling.      Impression: No acute fracture or dislocation of the right ankle.          MACRO:  None      Signed by: Tony Dove 9/15/2024 2:32 AM  Dictation workstation:   [id number]        Procedure: None    Assessment: Right lower leg strain     Plan: Cheryl presents today for reevaluation for work-related right lower leg strain. She is clinically doing better. She is involved in physical therapy. She will follow-up in 3-4 weeks for reevaluation. She will return to work on Monday with no restrictions.  She may call for any work modifications.  Good prognosis.    No orders of the defined types were placed in this encounter.     At the conclusion of the visit there were no further questions by the patient/family regarding their plan of care.  Patient was instructed to call or return with any issues, questions, or concerns regarding their injury and/or treatment plan described above.     10/10/24 at 9:05 AM - Caryn Nguyen MD  Scribe Attestation  By signing my name below, I, Jose Pilar Hernandez   attest that this documentation has been prepared under the direction and in the presence of Caryn Nguyen MD.    Office: [phone]    This note was prepared using voice recognition software.  The details of this note are  correct and have been reviewed, and corrected to the best of my ability.  Some grammatical errors may persist related to the Dragon software.

## 2024-10-10 NOTE — LETTER
October 10, 2024     Patient: Cheryl Bolivar   YOB: 1995   Date of Visit: 10/10/2024       To Whom It May Concern:    It is my medical opinion that Cheryl Bolivar  may return to work on 10/14/24 with no restrictions .    If you have any questions or concerns, please don't hesitate to call.         Sincerely,         Caryn Nguyen MD    CC: Farrah Huitron MA

## 2024-10-18 ENCOUNTER — TREATMENT (OUTPATIENT)
Dept: PHYSICAL THERAPY | Facility: CLINIC | Age: 29
End: 2024-10-18
Payer: COMMERCIAL

## 2024-10-18 DIAGNOSIS — S86.911D: Primary | ICD-10-CM

## 2024-10-18 PROCEDURE — 97140 MANUAL THERAPY 1/> REGIONS: CPT | Mod: GP,CQ

## 2024-10-18 PROCEDURE — 97110 THERAPEUTIC EXERCISES: CPT | Mod: GP,CQ

## 2024-10-18 ASSESSMENT — PAIN SCALES - GENERAL: PAINLEVEL_OUTOF10: 2

## 2024-10-18 ASSESSMENT — PAIN - FUNCTIONAL ASSESSMENT: PAIN_FUNCTIONAL_ASSESSMENT: 0-10

## 2024-10-18 NOTE — PROGRESS NOTES
"Physical Therapy Treatment    Patient Name: Cheryl Bolivar  MRN: [id number]  Today's Date: 10/18/2024  Time Calculation  Start Time: 0116  Stop Time: 0157  Time Calculation (min): 41 min  PT Therapeutic Procedures Time Entry  Manual Therapy Time Entry: 15  Therapeutic Exercise Time Entry: 25       Current Problem  1. Muscle strain of lower leg, right, subsequent encounter            Subjective   General   Pt stating most pain anterior ankle, but still with some occasional achilles' pain as well as some lateral joint line pain.   Insurance   Visit number: 4/12  [facility] APPROVED 12 (OPEN) PT VISITS STARTING ON EVAL DATE  (10-) C9 SENT TO [facility] 10-01-24/ALL   Precautions     Pain  Pain Assessment: 0-10  0-10 (Numeric) Pain Score: 2  Pain Type: Chronic pain  Pain Location: Ankle  Pain Orientation: Right, Inner, Outer    Objective   Treatments:  Manual  IASTM foot/calf  Grade 1-2 Talor mobs  PROM    ABC's x1  SLS blk foam pad 20\" x5  HR 1/2 roll x20  Wobble board f/l x20  Retro step ups 4\" 2x10  Self talor mobs on step 2x10  Assessment   Marked reduction pain with almost full pain free mobility following manual, most noted with talor mobs. Added several ex's to progress both ROM as well as strength PF throughout range. Updated HEP to progress ROM and joint mobility.    Plan:  Cont to progress strength and endurance     OP EDUCATION:       Goals:     "

## 2024-10-22 ENCOUNTER — APPOINTMENT (OUTPATIENT)
Dept: PHYSICAL THERAPY | Facility: CLINIC | Age: 29
End: 2024-10-22
Payer: COMMERCIAL

## 2024-10-23 NOTE — PROGRESS NOTES
"Physical Therapy Treatment    Patient Name: Cheryl Bolivar  MRN: [id number]  Today's Date: 10/24/2024  Time Calculation  Start Time: 0250  Stop Time: 0329  Time Calculation (min): 39 min     PT Therapeutic Procedures Time Entry  Manual Therapy Time Entry: 10  Neuromuscular Re-Education Time Entry: 15  Therapeutic Exercise Time Entry: 13                 Current Problem  1. Muscle strain of lower leg, right, subsequent encounter            Insurance:  Visit number: 5/12  [facility] APPROVED 12 (OPEN) PT VISITS STARTING ON EVAL DATE  (10-) C9 SENT TO [facility] 10-01-24/ALL   Precautions   none    Subjective   Subjective:   Pt reports that her ankle is doing good. She has been working, but doesn't really have any pain.  Pain   0/10    Objective   Treatments:  Manual  STM foot/ankle  Grade 1-2 Talor mobs  PROM    Tandom airex x 2 laps  Airex sidestepping RTB x 3 laps  1/2 roll HR 20x  Steamboats B RTB 15x ea  Bosu slow marching 20x  Wobbleboard lateral balance 1'  Higgys  cw/cww 5x ea  Tap downs 4 in 20x  Woodpeckers to wall 5x  Fwd step downs 4 in 20x  Power step ups 6 in 20x  SLB w/ fwd MB toss 20x    Not Today:    SLS blk foam pad 20\" x5  Wobble board f/l x20  Self talor mobs on step 2x10  Seated Gatroc/Soleus Stretch with Strap: 30 sec ea  Ankle 4-way: GTB x20 ea        Wobbleboard lat/ fwd/bk 10x ea        Squats 20x        SLB blue oval 20\"x2   Tandem B 20\"ea---         OP EDUCATION:   SLB      Assessment:   Pt still had some palpable tenderness along lateral ankle. Pt displayed good ankle stability with SLB activities. Had some increased ankle pain during woodpeckers, so only did 5x. Added TB to airex sidestepping whle maintaining good form. Improved strength displayed with tap downs and power step ups. Good overall completion of ex given    Plan:   Cont to progress ankle/ R LE strength              "

## 2024-10-24 ENCOUNTER — TREATMENT (OUTPATIENT)
Dept: PHYSICAL THERAPY | Facility: CLINIC | Age: 29
End: 2024-10-24
Payer: COMMERCIAL

## 2024-10-24 DIAGNOSIS — S86.911D: Primary | ICD-10-CM

## 2024-10-24 PROCEDURE — 97112 NEUROMUSCULAR REEDUCATION: CPT | Mod: GP,CQ

## 2024-10-24 PROCEDURE — 97110 THERAPEUTIC EXERCISES: CPT | Mod: GP,CQ

## 2024-10-24 PROCEDURE — 97140 MANUAL THERAPY 1/> REGIONS: CPT | Mod: GP,CQ

## 2024-10-31 ENCOUNTER — OFFICE VISIT (OUTPATIENT)
Dept: ORTHOPEDIC SURGERY | Facility: CLINIC | Age: 29
End: 2024-10-31
Payer: COMMERCIAL

## 2024-10-31 ENCOUNTER — LAB (OUTPATIENT)
Dept: LAB | Facility: LAB | Age: 29
End: 2024-10-31
Payer: COMMERCIAL

## 2024-10-31 DIAGNOSIS — Z00.00 HEALTH CARE MAINTENANCE: ICD-10-CM

## 2024-10-31 DIAGNOSIS — S86.911A MUSCLE STRAIN OF LOWER LEG, RIGHT, INITIAL ENCOUNTER: Primary | ICD-10-CM

## 2024-10-31 LAB
25(OH)D3 SERPL-MCNC: 24 NG/ML (ref 30–100)
ALBUMIN SERPL BCP-MCNC: 4.5 G/DL (ref 3.4–5)
ALP SERPL-CCNC: 80 U/L (ref 33–110)
ALT SERPL W P-5'-P-CCNC: 11 U/L (ref 7–45)
ANION GAP SERPL CALC-SCNC: 12 MMOL/L (ref 10–20)
AST SERPL W P-5'-P-CCNC: 13 U/L (ref 9–39)
BILIRUB SERPL-MCNC: 1.7 MG/DL (ref 0–1.2)
BUN SERPL-MCNC: 14 MG/DL (ref 6–23)
CALCIUM SERPL-MCNC: 9.3 MG/DL (ref 8.6–10.3)
CHLORIDE SERPL-SCNC: 105 MMOL/L (ref 98–107)
CHOLEST SERPL-MCNC: 169 MG/DL (ref 0–199)
CHOLESTEROL/HDL RATIO: 4.3
CO2 SERPL-SCNC: 26 MMOL/L (ref 21–32)
CREAT SERPL-MCNC: 0.72 MG/DL (ref 0.5–1.05)
EGFRCR SERPLBLD CKD-EPI 2021: >90 ML/MIN/1.73M*2
ERYTHROCYTE [DISTWIDTH] IN BLOOD BY AUTOMATED COUNT: 12.8 % (ref 11.5–14.5)
GLUCOSE SERPL-MCNC: 78 MG/DL (ref 74–99)
HCT VFR BLD AUTO: 44.3 % (ref 36–46)
HDLC SERPL-MCNC: 39.5 MG/DL
HGB BLD-MCNC: 14.4 G/DL (ref 12–16)
LDLC SERPL CALC-MCNC: 92 MG/DL
MCH RBC QN AUTO: 29.4 PG (ref 26–34)
MCHC RBC AUTO-ENTMCNC: 32.5 G/DL (ref 32–36)
MCV RBC AUTO: 91 FL (ref 80–100)
NON HDL CHOLESTEROL: 130 MG/DL (ref 0–149)
NRBC BLD-RTO: 0 /100 WBCS (ref 0–0)
PLATELET # BLD AUTO: 226 X10*3/UL (ref 150–450)
POTASSIUM SERPL-SCNC: 4.5 MMOL/L (ref 3.5–5.3)
PROT SERPL-MCNC: 7.2 G/DL (ref 6.4–8.2)
RBC # BLD AUTO: 4.89 X10*6/UL (ref 4–5.2)
SODIUM SERPL-SCNC: 138 MMOL/L (ref 136–145)
TRIGL SERPL-MCNC: 190 MG/DL (ref 0–149)
TSH SERPL-ACNC: 1.49 MIU/L (ref 0.44–3.98)
VIT B12 SERPL-MCNC: 225 PG/ML (ref 211–911)
VLDL: 38 MG/DL (ref 0–40)
WBC # BLD AUTO: 9.2 X10*3/UL (ref 4.4–11.3)

## 2024-10-31 PROCEDURE — 99213 OFFICE O/P EST LOW 20 MIN: CPT | Performed by: INTERNAL MEDICINE

## 2024-11-01 DIAGNOSIS — E55.9 VITAMIN D DEFICIENCY: Primary | ICD-10-CM

## 2024-11-01 RX ORDER — ERGOCALCIFEROL 1.25 MG/1
1.25 CAPSULE ORAL
Qty: 12 CAPSULE | Refills: 0 | Status: SHIPPED | OUTPATIENT
Start: 2024-11-03

## 2024-11-04 ENCOUNTER — APPOINTMENT (OUTPATIENT)
Dept: PHYSICAL THERAPY | Facility: CLINIC | Age: 29
End: 2024-11-04
Payer: COMMERCIAL

## 2024-11-04 DIAGNOSIS — S86.911D: Primary | ICD-10-CM

## 2024-11-08 ENCOUNTER — TREATMENT (OUTPATIENT)
Dept: PHYSICAL THERAPY | Facility: CLINIC | Age: 29
End: 2024-11-08
Payer: COMMERCIAL

## 2024-11-08 DIAGNOSIS — S86.911D: Primary | ICD-10-CM

## 2024-11-08 PROCEDURE — 97110 THERAPEUTIC EXERCISES: CPT | Mod: GP,CQ

## 2024-11-08 ASSESSMENT — PAIN SCALES - GENERAL: PAINLEVEL_OUTOF10: 0 - NO PAIN

## 2024-11-08 ASSESSMENT — PAIN - FUNCTIONAL ASSESSMENT: PAIN_FUNCTIONAL_ASSESSMENT: 0-10

## 2024-11-08 NOTE — PROGRESS NOTES
"Physical Therapy Treatment    Patient Name: Cheryl Bolivar  MRN: [id number]  Today's Date: 11/8/2024  Time Calculation  Start Time: 0915  Stop Time: 0945  Time Calculation (min): 30 min  PT Therapeutic Procedures Time Entry  Therapeutic Exercise Time Entry: 30       Current Problem  1. Muscle strain of lower leg, right, subsequent encounter  Follow Up In Physical Therapy          Subjective   General   Pt stating no issues for th most part.   Insurance   [facility]   Approved 12v  Date Range Open  Visit 6  Precautions     Pain  Pain Assessment: 0-10  0-10 (Numeric) Pain Score: 0 - No pain    Objective   LEFS: 77/80 11/8/24    Treatments:  Treadmill walking 3.0% 2.5 mph  HR/TR 1/2 roll x20  Retro step ups 8in 2x10  Wobble board F/L x30  SLS Bosu 30\" x5    Assessment   Pt doing extremely well to this point. Denies pain throughout and showing almost full strength throughout range foot/ankle. Added several ex' including step ups and treadmill to challenge eccentric control and stability without issue.     Plan:  Pt to be placed on 30 day hold to ascertain ability to manage symptoms on her own    OP EDUCATION:       Goals:     "

## 2024-12-31 ENCOUNTER — APPOINTMENT (OUTPATIENT)
Dept: URGENT CARE | Age: 29
End: 2024-12-31

## 2025-04-08 ENCOUNTER — TELEPHONE (OUTPATIENT)
Dept: PRIMARY CARE | Facility: CLINIC | Age: 30
End: 2025-04-08
Payer: COMMERCIAL

## 2025-04-08 NOTE — TELEPHONE ENCOUNTER
PATIENT CALLING CURRENT ON PHYSICAL, CAN SHE DROP OFF PAPERWORK FOR SCHOOL AND GET TITERS FOR TB AND HEP B DONE WHEN SHE DROPS OFF?  PLEASE ADVISE

## 2025-04-09 DIAGNOSIS — Z00.00 HEALTHCARE MAINTENANCE: Primary | ICD-10-CM

## 2025-04-20 LAB
HBV SURFACE AB SERPL IA-ACNC: 211 MIU/ML
IGNF NEG CNTRL BLD: NORMAL
M TB IFN-G BLD-IMP: NEGATIVE
MITOGEN IGNF.SPOT COUNT BLD: NORMAL
QUEST PANEL A SPOT COUNT: 0
QUEST PANEL B SPOT COUNT: 0

## 2025-04-21 ENCOUNTER — TELEPHONE (OUTPATIENT)
Dept: PRIMARY CARE | Facility: CLINIC | Age: 30
End: 2025-04-21
Payer: COMMERCIAL

## 2025-04-21 NOTE — TELEPHONE ENCOUNTER
----- Message from Avis Ga sent at 4/21/2025 12:52 PM EDT -----  Call patient tb and hep b are good  ----- Message -----  From: [employer] Results In  Sent: 4/19/2025  11:47 AM EDT  To: Avis Ga, DO

## 2025-05-06 ENCOUNTER — APPOINTMENT (OUTPATIENT)
Dept: PRIMARY CARE | Facility: CLINIC | Age: 30
End: 2025-05-06

## 2025-07-03 DIAGNOSIS — N91.2 AMENORRHEA: Primary | ICD-10-CM

## 2025-07-06 LAB — B-HCG SERPL-ACNC: <5 MIU/ML

## 2025-07-11 ENCOUNTER — OFFICE VISIT (OUTPATIENT)
Dept: PRIMARY CARE | Facility: CLINIC | Age: 30
End: 2025-07-11

## 2025-07-11 VITALS
TEMPERATURE: 97.9 F | OXYGEN SATURATION: 100 % | SYSTOLIC BLOOD PRESSURE: 116 MMHG | RESPIRATION RATE: 16 BRPM | HEART RATE: 72 BPM | DIASTOLIC BLOOD PRESSURE: 72 MMHG | WEIGHT: 222 LBS | HEIGHT: 66 IN | BODY MASS INDEX: 35.68 KG/M2

## 2025-07-11 DIAGNOSIS — R11.0 NAUSEA: Primary | ICD-10-CM

## 2025-07-11 PROBLEM — S86.911D: Status: RESOLVED | Noted: 2024-10-01 | Resolved: 2025-07-11

## 2025-07-11 PROCEDURE — 1036F TOBACCO NON-USER: CPT | Performed by: INTERNAL MEDICINE

## 2025-07-11 PROCEDURE — 3008F BODY MASS INDEX DOCD: CPT | Performed by: INTERNAL MEDICINE

## 2025-07-11 PROCEDURE — 99212 OFFICE O/P EST SF 10 MIN: CPT | Performed by: INTERNAL MEDICINE

## 2025-07-11 RX ORDER — OMEPRAZOLE 40 MG/1
40 CAPSULE, DELAYED RELEASE ORAL
Qty: 30 CAPSULE | Refills: 1 | Status: SHIPPED | OUTPATIENT
Start: 2025-07-11 | End: 2025-09-09

## 2025-07-11 ASSESSMENT — ENCOUNTER SYMPTOMS
FEVER: 0
FATIGUE: 0
COUGH: 0
SHORTNESS OF BREATH: 0

## 2025-07-11 NOTE — PROGRESS NOTES
"Subjective   Cheryl Bolivar is a 30 y.o. female who presents for Nausea.    HPI   Pt c/o N/V since 6/29/25.  Every AM upon waking up. Resolves after a couple of hours.  Occasional Nausea in afternoon.  Occasional epigastric discomfort.  Usual does not eat after 7pm.  Denies constipation/diarrhea. Black/visible blood in stool.  HCG 7/5/25 negative.  Last menstrual cycle 6/18/25.    Review of Systems   Constitutional:  Negative for fatigue and fever.   Respiratory:  Negative for cough and shortness of breath.    Cardiovascular:  Negative for chest pain and leg swelling.   All other systems reviewed and are negative.      Health Maintenance Due   Topic Date Due    Cervical Cancer Screening  01/06/2023    COVID-19 Vaccine (1 - 2024-25 season) Never done       Objective   /72   Pulse 72   Temp 36.6 °C (97.9 °F)   Resp 16   Ht 1.676 m (5' 6\")   Wt 101 kg (222 lb)   SpO2 100%   BMI 35.83 kg/m²     Physical Exam  Vitals and nursing note reviewed.   Constitutional:       Appearance: Normal appearance.   HENT:      Head: Normocephalic.   Eyes:      Conjunctiva/sclera: Conjunctivae normal.      Pupils: Pupils are equal, round, and reactive to light.   Cardiovascular:      Rate and Rhythm: Normal rate and regular rhythm.      Pulses: Normal pulses.      Heart sounds: Normal heart sounds.   Pulmonary:      Effort: Pulmonary effort is normal.      Breath sounds: Normal breath sounds.   Musculoskeletal:         General: No swelling.      Cervical back: Neck supple.   Skin:     General: Skin is warm and dry.   Neurological:      General: No focal deficit present.      Mental Status: She is oriented to person, place, and time.         Assessment/Plan   Problem List Items Addressed This Visit    None  Visit Diagnoses         Nausea    -  Primary    Relevant Medications    omeprazole (PriLOSEC) 40 mg DR capsule    Other Relevant Orders    CBC    Comprehensive Metabolic Panel    Lipase        Trial of omeprazole  Repeat " preg in one week  Check labs if not improvng in 2-3 weeks